# Patient Record
Sex: MALE | Race: BLACK OR AFRICAN AMERICAN | NOT HISPANIC OR LATINO | Employment: FULL TIME | URBAN - METROPOLITAN AREA
[De-identification: names, ages, dates, MRNs, and addresses within clinical notes are randomized per-mention and may not be internally consistent; named-entity substitution may affect disease eponyms.]

---

## 2022-03-25 ENCOUNTER — OFFICE VISIT (OUTPATIENT)
Dept: GASTROENTEROLOGY | Facility: CLINIC | Age: 57
End: 2022-03-25
Payer: MEDICAID

## 2022-03-25 VITALS
SYSTOLIC BLOOD PRESSURE: 143 MMHG | HEIGHT: 71 IN | WEIGHT: 175.06 LBS | DIASTOLIC BLOOD PRESSURE: 68 MMHG | BODY MASS INDEX: 24.51 KG/M2 | HEART RATE: 69 BPM

## 2022-03-25 DIAGNOSIS — R10.9 ABDOMINAL PAIN, UNSPECIFIED ABDOMINAL LOCATION: Primary | ICD-10-CM

## 2022-03-25 DIAGNOSIS — Z87.19 HISTORY OF GALLSTONES: ICD-10-CM

## 2022-03-25 PROCEDURE — 3008F BODY MASS INDEX DOCD: CPT | Mod: CPTII,,, | Performed by: NURSE PRACTITIONER

## 2022-03-25 PROCEDURE — 99204 PR OFFICE/OUTPT VISIT, NEW, LEVL IV, 45-59 MIN: ICD-10-PCS | Mod: S$PBB,,, | Performed by: NURSE PRACTITIONER

## 2022-03-25 PROCEDURE — 1159F PR MEDICATION LIST DOCUMENTED IN MEDICAL RECORD: ICD-10-PCS | Mod: CPTII,,, | Performed by: NURSE PRACTITIONER

## 2022-03-25 PROCEDURE — 3078F DIAST BP <80 MM HG: CPT | Mod: CPTII,,, | Performed by: NURSE PRACTITIONER

## 2022-03-25 PROCEDURE — 1159F MED LIST DOCD IN RCRD: CPT | Mod: CPTII,,, | Performed by: NURSE PRACTITIONER

## 2022-03-25 PROCEDURE — 3077F PR MOST RECENT SYSTOLIC BLOOD PRESSURE >= 140 MM HG: ICD-10-PCS | Mod: CPTII,,, | Performed by: NURSE PRACTITIONER

## 2022-03-25 PROCEDURE — 99203 OFFICE O/P NEW LOW 30 MIN: CPT | Mod: PBBFAC,PN | Performed by: NURSE PRACTITIONER

## 2022-03-25 PROCEDURE — 99999 PR PBB SHADOW E&M-NEW PATIENT-LVL III: CPT | Mod: PBBFAC,,, | Performed by: NURSE PRACTITIONER

## 2022-03-25 PROCEDURE — 99204 OFFICE O/P NEW MOD 45 MIN: CPT | Mod: S$PBB,,, | Performed by: NURSE PRACTITIONER

## 2022-03-25 PROCEDURE — 3008F PR BODY MASS INDEX (BMI) DOCUMENTED: ICD-10-PCS | Mod: CPTII,,, | Performed by: NURSE PRACTITIONER

## 2022-03-25 PROCEDURE — 99999 PR PBB SHADOW E&M-NEW PATIENT-LVL III: ICD-10-PCS | Mod: PBBFAC,,, | Performed by: NURSE PRACTITIONER

## 2022-03-25 PROCEDURE — 3078F PR MOST RECENT DIASTOLIC BLOOD PRESSURE < 80 MM HG: ICD-10-PCS | Mod: CPTII,,, | Performed by: NURSE PRACTITIONER

## 2022-03-25 PROCEDURE — 3077F SYST BP >= 140 MM HG: CPT | Mod: CPTII,,, | Performed by: NURSE PRACTITIONER

## 2022-03-25 RX ORDER — PANTOPRAZOLE SODIUM 40 MG/1
40 TABLET, DELAYED RELEASE ORAL DAILY
Status: ON HOLD | COMMUNITY
Start: 2022-01-31 | End: 2023-12-19 | Stop reason: HOSPADM

## 2022-03-25 RX ORDER — TAMSULOSIN HYDROCHLORIDE 0.4 MG/1
CAPSULE ORAL
COMMUNITY

## 2022-03-25 RX ORDER — DICYCLOMINE HYDROCHLORIDE 20 MG/1
20 TABLET ORAL 4 TIMES DAILY
Qty: 120 TABLET | Refills: 0 | Status: SHIPPED | OUTPATIENT
Start: 2022-03-25 | End: 2022-04-24

## 2022-03-25 NOTE — PROGRESS NOTES
GASTROENTEROLOGY CLINIC NOTE    Chief Complaint: The encounter diagnosis was Abdominal pain, unspecified abdominal location.  Referring provider/PCP: Seb Rene MD    HPI:  Vishal Dominguez is a 57 y.o. male who is a new patient to me with a PMH of abdominal pain.  He is here today to establish care for abdominal pain. This is a new problem that has been ongoing for about one year. The pain occurs about 2-3 times a day with sudden onset and lasts about 30 minutes then subsides.  It is aggravated by eating and occasionally alleviated with having a bowel movement.  Pain is primarily located in the RUQ and does not radiate.  He describes the pain as stabbing in nature.  The pain is not aggravated or alleviated with changes in position.  Bowel movements occur daily but he does report occasional constipation.  No melena, hematochezia, reflux, or pyrosis.  He has tried Protonix and Prilosec for his symptoms and reports no improvement.  He sought care at emergency room about one year ago for symptoms. Ultrasound and abdominal xray performed.  Ultrasound revealed gallbladder polyp, few gallstones, and CBD 6.8mm.  Moderate stool burden noted on xray.      NSAIDs: No  Anticoagulation or Antiplatelet: No    Prior Upper Endoscopy: No  Prior Colonoscopy: No  Family h/o Colon Cancer: No  Family h/o Crohn's Disease or Ulcerative Colitis: No  Family h/o Celiac Sprue: No  Abdominal Surgeries: 2009 bilateral inguinal hernia repair    Review of Systems   Constitutional: Negative for weight loss.   HENT: Negative for sore throat.    Eyes: Negative for blurred vision.   Respiratory: Negative for cough.    Cardiovascular: Negative for chest pain.   Gastrointestinal: Positive for abdominal pain (RUQ). Negative for blood in stool, constipation, diarrhea, heartburn, melena, nausea and vomiting.   Genitourinary: Negative for dysuria.   Musculoskeletal: Negative for myalgias.   Skin: Negative for rash.   Neurological: Negative for  "headaches.   Endo/Heme/Allergies: Negative for environmental allergies.   Psychiatric/Behavioral: Negative for suicidal ideas. The patient is not nervous/anxious.        Past Medical History: has no past medical history on file.    Past Surgical History: has no past surgical history on file.    Family History:family history is not on file.    Allergies: Review of patient's allergies indicates:  No Known Allergies    Social History:     Home medications:   Current Outpatient Medications on File Prior to Visit   Medication Sig Dispense Refill    ondansetron (ZOFRAN-ODT) 4 MG TbDL Take 1 tablet (4 mg total) by mouth every 6 (six) hours as needed (nausea or vomiting). 20 tablet 0    pantoprazole (PROTONIX) 40 MG tablet Take 40 mg by mouth once daily.      tamsulosin (FLOMAX) 0.4 mg Cap tamsulosin 0.4 mg capsule   TAKE ONE CAPSULE BY MOUTH EVERY DAY      omeprazole (PRILOSEC) 20 MG capsule Take 1 capsule (20 mg total) by mouth once daily. 30 capsule 0     No current facility-administered medications on file prior to visit.       Vital signs:  BP (!) 143/68   Pulse 69   Ht 5' 11" (1.803 m)   Wt 79.4 kg (175 lb 0.7 oz)   BMI 24.41 kg/m²     Physical Exam  Vitals reviewed.   Constitutional:       General: He is not in acute distress.     Appearance: Normal appearance. He is not ill-appearing.   HENT:      Head: Normocephalic.   Cardiovascular:      Rate and Rhythm: Normal rate and regular rhythm.      Heart sounds: Normal heart sounds. No murmur heard.  Pulmonary:      Effort: Pulmonary effort is normal. No respiratory distress.      Breath sounds: Normal breath sounds.   Chest:      Chest wall: No tenderness.   Abdominal:      General: Bowel sounds are normal. There is no distension.      Palpations: Abdomen is soft.      Tenderness: There is abdominal tenderness in the right upper quadrant. Negative signs include Khalil's sign.      Hernia: No hernia is present.      Comments: Negative Carnett's Sign   Skin:     " General: Skin is warm.   Neurological:      Mental Status: He is alert and oriented to person, place, and time.   Psychiatric:         Mood and Affect: Mood normal.         Behavior: Behavior normal.         Routine labs:  Lab Results   Component Value Date    WBC 10.00 03/01/2021    HGB 13.2 (L) 03/01/2021    HCT 38.7 (L) 03/01/2021    MCV 80 (L) 03/01/2021     03/01/2021     No results found for: INR  No results found for: IRON, FERRITIN, TIBC, FESATURATED  Lab Results   Component Value Date     03/01/2021    K 4.1 03/01/2021     03/01/2021    CO2 26 03/01/2021    BUN 12 03/01/2021    CREATININE 0.9 03/01/2021     Lab Results   Component Value Date    ALBUMIN 4.2 03/01/2021    ALT 17 03/01/2021    AST 24 03/01/2021    ALKPHOS 46 03/01/2021    BILITOT 0.7 03/01/2021     No results found for: GLUCOSE  No results found for: TSH  Lab Results   Component Value Date    CALCIUM 9.0 03/01/2021       Imaging:      I have reviewed prior labs, imaging, and notes.      Assessment:  1. Abdominal pain, unspecified abdominal location    2. History of gallstones        Plan:  Orders Placed This Encounter    US Abdomen Complete    X-Ray Abdomen AP 1 View    dicyclomine (BENTYL) 20 mg tablet     Abdominal ultrasound d/t continued abdominal pain and h/o gallstones.  Abdominal xray to further evaluate stool burden.   Bentyl 20mg QID PRN for abdominal pain.     Recommend colonoscopy for colon cancer screening. Patient would like to have ultrasound and xray done first. If pain persists and imaging is unrevealing, consider colonoscopy.     Plan of care discussed with patient who is in agreement and verbalized understanding.     I have explained the planned procedures to the patient.The risks, benefits and alternatives of the procedure were also explained in detail. Patient verbalized understanding, all questions were answered. The patient agrees to proceed as planned    Follow Up: Pending Above Workup          Trudy  YASMIN Lackey,FNP-BC  [facility]/[place]

## 2022-05-13 ENCOUNTER — TELEPHONE (OUTPATIENT)
Dept: PULMONOLOGY | Facility: CLINIC | Age: 57
End: 2022-05-13
Payer: MEDICAID

## 2022-05-13 NOTE — TELEPHONE ENCOUNTER
Gave patient appt for 6/3/2022 to see Trudy Castro and appt on 6/2/2022 to get his US abd and xray abd.      ----- Message from Angie Reyes-Ruiz, MA sent at 5/13/2022  2:28 PM CDT -----  Spoke to patient sister and she states her brother call [phone] but they stated it wasn't available. Can you please call his sister  at [phone]. Thank you.  ----- Message -----  From: Maria E Hickman  Sent: 5/13/2022   2:26 PM CDT  To: [facility]iy[facility] Yates Staff    Type:  Needs Medical Advice    Who Called:sister  Reason:needs to see you on a Tuesday for a possible ulcer   Would the patient rather a call back or a response via [facility]? call  Best Call Back Number: [phone]  Additional Information: none

## 2022-06-03 DIAGNOSIS — Z87.19 HISTORY OF GALLSTONES: ICD-10-CM

## 2022-06-03 DIAGNOSIS — R10.9 ABDOMINAL PAIN, UNSPECIFIED ABDOMINAL LOCATION: Primary | ICD-10-CM

## 2022-06-23 ENCOUNTER — PATIENT MESSAGE (OUTPATIENT)
Dept: GASTROENTEROLOGY | Facility: CLINIC | Age: 57
End: 2022-06-23
Payer: MEDICAID

## 2022-06-23 DIAGNOSIS — Z87.19 HISTORY OF GALLSTONES: Primary | ICD-10-CM

## 2022-06-24 ENCOUNTER — TELEPHONE (OUTPATIENT)
Dept: GASTROENTEROLOGY | Facility: CLINIC | Age: 57
End: 2022-06-24
Payer: MEDICAID

## 2022-06-24 NOTE — TELEPHONE ENCOUNTER
[facility] for the patient to call for results. I spoke with his sister and she will have him call me.        ----- Message from Trudy Castro NP sent at 6/23/2022  3:49 PM CDT -----  Please let patient know I reviewed both his ultrasound and xray results.  Gallstones noted in gallbladder.  I am going to enter a referral in for him to meet with surgery as this may be contributing to his symptoms.  Xray shows stool throughout the large intestine.  I would like him to start Metamucil daily. This is a fiber supplement to help form the right consistency of stool and move it through the large intestine. Thanks.

## 2022-07-05 ENCOUNTER — OFFICE VISIT (OUTPATIENT)
Dept: SURGERY | Facility: CLINIC | Age: 57
End: 2022-07-05
Payer: MEDICAID

## 2022-07-05 VITALS
OXYGEN SATURATION: 99 % | WEIGHT: 176.81 LBS | BODY MASS INDEX: 24.66 KG/M2 | SYSTOLIC BLOOD PRESSURE: 141 MMHG | DIASTOLIC BLOOD PRESSURE: 71 MMHG | HEART RATE: 69 BPM

## 2022-07-05 DIAGNOSIS — Z87.19 HISTORY OF GALLSTONES: Primary | ICD-10-CM

## 2022-07-05 PROCEDURE — 1160F RVW MEDS BY RX/DR IN RCRD: CPT | Mod: CPTII,,, | Performed by: SURGERY

## 2022-07-05 PROCEDURE — 3077F PR MOST RECENT SYSTOLIC BLOOD PRESSURE >= 140 MM HG: ICD-10-PCS | Mod: CPTII,,, | Performed by: SURGERY

## 2022-07-05 PROCEDURE — 99203 PR OFFICE/OUTPT VISIT, NEW, LEVL III, 30-44 MIN: ICD-10-PCS | Mod: S$PBB,,, | Performed by: SURGERY

## 2022-07-05 PROCEDURE — 99999 PR PBB SHADOW E&M-EST. PATIENT-LVL IV: ICD-10-PCS | Mod: PBBFAC,,, | Performed by: SURGERY

## 2022-07-05 PROCEDURE — 3078F DIAST BP <80 MM HG: CPT | Mod: CPTII,,, | Performed by: SURGERY

## 2022-07-05 PROCEDURE — 1159F PR MEDICATION LIST DOCUMENTED IN MEDICAL RECORD: ICD-10-PCS | Mod: CPTII,,, | Performed by: SURGERY

## 2022-07-05 PROCEDURE — 1160F PR REVIEW ALL MEDS BY PRESCRIBER/CLIN PHARMACIST DOCUMENTED: ICD-10-PCS | Mod: CPTII,,, | Performed by: SURGERY

## 2022-07-05 PROCEDURE — 1159F MED LIST DOCD IN RCRD: CPT | Mod: CPTII,,, | Performed by: SURGERY

## 2022-07-05 PROCEDURE — 3078F PR MOST RECENT DIASTOLIC BLOOD PRESSURE < 80 MM HG: ICD-10-PCS | Mod: CPTII,,, | Performed by: SURGERY

## 2022-07-05 PROCEDURE — 99999 PR PBB SHADOW E&M-EST. PATIENT-LVL IV: CPT | Mod: PBBFAC,,, | Performed by: SURGERY

## 2022-07-05 PROCEDURE — 99214 OFFICE O/P EST MOD 30 MIN: CPT | Mod: PBBFAC,PN | Performed by: SURGERY

## 2022-07-05 PROCEDURE — 3008F BODY MASS INDEX DOCD: CPT | Mod: CPTII,,, | Performed by: SURGERY

## 2022-07-05 PROCEDURE — 3008F PR BODY MASS INDEX (BMI) DOCUMENTED: ICD-10-PCS | Mod: CPTII,,, | Performed by: SURGERY

## 2022-07-05 PROCEDURE — 99203 OFFICE O/P NEW LOW 30 MIN: CPT | Mod: S$PBB,,, | Performed by: SURGERY

## 2022-07-05 PROCEDURE — 3077F SYST BP >= 140 MM HG: CPT | Mod: CPTII,,, | Performed by: SURGERY

## 2022-07-10 NOTE — PROGRESS NOTES
History & Physical    SUBJECTIVE:     History of Present Illness:  Patient is a 57 y.o. male presents with epigastric and right upper quadrant abdominal pain symptoms associated with meals.  Has not had any severe attacks however does continue to persist with this pain.  For ultrasound shows small gallstones versus a polyp.  Discussed with patient surgical intervention versus observation.  Also discussed HIDA scan to see if he had any biliary colic or dyskinesia.  After HIDA scan will go over results to discuss need for surgical intervention.      Chief Complaint   Patient presents with    Gall Bladder Problem     Pain       Review of patient's allergies indicates:  No Known Allergies    Current Outpatient Medications   Medication Sig Dispense Refill    ondansetron (ZOFRAN-ODT) 4 MG TbDL Take 1 tablet (4 mg total) by mouth every 6 (six) hours as needed (nausea or vomiting). 20 tablet 0    pantoprazole (PROTONIX) 40 MG tablet Take 40 mg by mouth once daily.      tamsulosin (FLOMAX) 0.4 mg Cap tamsulosin 0.4 mg capsule   TAKE ONE CAPSULE BY MOUTH EVERY DAY      omeprazole (PRILOSEC) 20 MG capsule Take 1 capsule (20 mg total) by mouth once daily. 30 capsule 0     No current facility-administered medications for this visit.       No past medical history on file.  No past surgical history on file.  No family history on file.        Review of Systems:  Review of Systems   Constitutional: Negative for appetite change, fatigue, fever and unexpected weight change.   HENT: Negative for sore throat and trouble swallowing.    Eyes: Negative.    Respiratory: Negative for cough, shortness of breath and wheezing.    Cardiovascular: Negative for chest pain and leg swelling.   Gastrointestinal: Positive for abdominal pain. Negative for abdominal distention, blood in stool, constipation, diarrhea, nausea and vomiting.   Endocrine: Negative.    Genitourinary: Negative.    Musculoskeletal: Negative for back pain.   Skin: Negative.   Negative for rash.   Allergic/Immunologic: Negative.    Neurological: Negative.    Hematological: Negative.    Psychiatric/Behavioral: Negative for confusion.       OBJECTIVE:     Vital Signs (Most Recent)  Pulse: 69 (07/05/22 0811)  BP: (!) 141/71 (07/05/22 0811)  SpO2: 99 % (07/05/22 0811)     80.2 kg (176 lb 12.9 oz)     Physical Exam:  Physical Exam  Vitals and nursing note reviewed.   Constitutional:       Appearance: He is well-developed.   HENT:      Head: Normocephalic and atraumatic.   Cardiovascular:      Rate and Rhythm: Normal rate.      Heart sounds: Normal heart sounds.   Pulmonary:      Effort: Pulmonary effort is normal.   Abdominal:      General: Bowel sounds are normal. There is no distension.      Palpations: Abdomen is soft.      Tenderness: There is no abdominal tenderness.   Musculoskeletal:         General: Normal range of motion.      Cervical back: Normal range of motion.   Skin:     General: Skin is warm and dry.      Capillary Refill: Capillary refill takes less than 2 seconds.   Neurological:      Mental Status: He is alert and oriented to person, place, and time.   Psychiatric:         Behavior: Behavior normal.         Laboratory  CBC: Reviewed  CMP: Reviewed    Diagnostic Results:  US: Reviewed  Small gallbladder stones versus polyps    ASSESSMENT/PLAN:     57-year-old male with small gallbladder stone versus polyp and abdominal pain.  HIDA scan for now.  Return to clinic after go over results.

## 2022-07-15 ENCOUNTER — OFFICE VISIT (OUTPATIENT)
Dept: GASTROENTEROLOGY | Facility: CLINIC | Age: 57
End: 2022-07-15
Payer: MEDICAID

## 2022-07-15 VITALS
HEIGHT: 71 IN | BODY MASS INDEX: 24.97 KG/M2 | SYSTOLIC BLOOD PRESSURE: 132 MMHG | HEART RATE: 65 BPM | WEIGHT: 178.38 LBS | DIASTOLIC BLOOD PRESSURE: 64 MMHG | OXYGEN SATURATION: 97 %

## 2022-07-15 DIAGNOSIS — K59.04 CHRONIC IDIOPATHIC CONSTIPATION: Primary | ICD-10-CM

## 2022-07-15 DIAGNOSIS — R35.0 FREQUENT URINATION: ICD-10-CM

## 2022-07-15 DIAGNOSIS — Z12.11 SCREENING FOR COLON CANCER: ICD-10-CM

## 2022-07-15 PROCEDURE — 3075F SYST BP GE 130 - 139MM HG: CPT | Mod: CPTII,,, | Performed by: NURSE PRACTITIONER

## 2022-07-15 PROCEDURE — 3008F PR BODY MASS INDEX (BMI) DOCUMENTED: ICD-10-PCS | Mod: CPTII,,, | Performed by: NURSE PRACTITIONER

## 2022-07-15 PROCEDURE — 3008F BODY MASS INDEX DOCD: CPT | Mod: CPTII,,, | Performed by: NURSE PRACTITIONER

## 2022-07-15 PROCEDURE — 99214 OFFICE O/P EST MOD 30 MIN: CPT | Mod: S$PBB,,, | Performed by: NURSE PRACTITIONER

## 2022-07-15 PROCEDURE — 3078F PR MOST RECENT DIASTOLIC BLOOD PRESSURE < 80 MM HG: ICD-10-PCS | Mod: CPTII,,, | Performed by: NURSE PRACTITIONER

## 2022-07-15 PROCEDURE — 99999 PR PBB SHADOW E&M-EST. PATIENT-LVL V: CPT | Mod: PBBFAC,,, | Performed by: NURSE PRACTITIONER

## 2022-07-15 PROCEDURE — 3075F PR MOST RECENT SYSTOLIC BLOOD PRESS GE 130-139MM HG: ICD-10-PCS | Mod: CPTII,,, | Performed by: NURSE PRACTITIONER

## 2022-07-15 PROCEDURE — 99215 OFFICE O/P EST HI 40 MIN: CPT | Mod: PBBFAC,PN | Performed by: NURSE PRACTITIONER

## 2022-07-15 PROCEDURE — 1159F MED LIST DOCD IN RCRD: CPT | Mod: CPTII,,, | Performed by: NURSE PRACTITIONER

## 2022-07-15 PROCEDURE — 1159F PR MEDICATION LIST DOCUMENTED IN MEDICAL RECORD: ICD-10-PCS | Mod: CPTII,,, | Performed by: NURSE PRACTITIONER

## 2022-07-15 PROCEDURE — 99214 PR OFFICE/OUTPT VISIT, EST, LEVL IV, 30-39 MIN: ICD-10-PCS | Mod: S$PBB,,, | Performed by: NURSE PRACTITIONER

## 2022-07-15 PROCEDURE — 99999 PR PBB SHADOW E&M-EST. PATIENT-LVL V: ICD-10-PCS | Mod: PBBFAC,,, | Performed by: NURSE PRACTITIONER

## 2022-07-15 PROCEDURE — 3078F DIAST BP <80 MM HG: CPT | Mod: CPTII,,, | Performed by: NURSE PRACTITIONER

## 2022-07-15 NOTE — PROGRESS NOTES
GASTROENTEROLOGY CLINIC NOTE    Chief Complaint: There were no encounter diagnoses.  Referring provider/PCP: Seb Rene MD    HPI:  Vishal Dominguez is a 57 y.o. male who is a new patient to me with a PMH of abdominal pain.  He is here today to establish care for abdominal pain. This is a new problem that has been ongoing for about one year. The pain occurs about 2-3 times a day with sudden onset and lasts about 30 minutes then subsides.  It is aggravated by eating and occasionally alleviated with having a bowel movement.  Pain is primarily located in the RUQ and does not radiate.  He describes the pain as stabbing in nature.  The pain is not aggravated or alleviated with changes in position.  Bowel movements occur daily but he does report occasional constipation.  No melena, hematochezia, reflux, or pyrosis.  He has tried Protonix and Prilosec for his symptoms and reports no improvement.  He sought care at emergency room about one year ago for symptoms. Ultrasound and abdominal xray performed.  Ultrasound revealed gallbladder polyp, few gallstones, and CBD 6.8mm.  Moderate stool burden noted on xray.      Interval Note 7/15/2022  Mr. Dominguez who is known to me presents to clinic for follow up visit to review ultrasound and xray results.  Following his last appointment, ultrasound and KUB were obtained due to continued RUQ abdominal pain.  Ultrasound revealed gallstone and stool noted on left side of colon with KUB.  He was referred to surgery to discuss cholecystectomy.  HIDA scan was done which was normal; he is to follow up with surgery to discuss HIDA results. Reports abdominal pain has improved.  Bowel movements occur daily bur still straining some.  No nausea, vomiting, reflux, nocturnal symptoms, melena, or hematochezia. He is c/o urinary frequency.     NSAIDs: No  Anticoagulation or Antiplatelet: No    Prior Upper Endoscopy: No  Prior Colonoscopy: No  Family h/o Colon Cancer: No  Family h/o  "Crohn's Disease or Ulcerative Colitis: No  Family h/o Celiac Sprue: No  Abdominal Surgeries: 2009 bilateral inguinal hernia repair    Review of Systems   Constitutional: Negative for weight loss.   HENT: Negative for sore throat.    Eyes: Negative for blurred vision.   Respiratory: Negative for cough.    Cardiovascular: Negative for chest pain.   Gastrointestinal: Negative for abdominal pain, blood in stool, constipation, diarrhea, heartburn, melena, nausea and vomiting.   Genitourinary: Positive for frequency. Negative for dysuria.   Musculoskeletal: Negative for myalgias.   Skin: Negative for rash.   Neurological: Negative for headaches.   Endo/Heme/Allergies: Negative for environmental allergies.   Psychiatric/Behavioral: Negative for suicidal ideas. The patient is not nervous/anxious.        Past Medical History: has no past medical history on file.    Past Surgical History: has no past surgical history on file.    Family History:family history is not on file.    Allergies: Review of patient's allergies indicates:  No Known Allergies    Social History:     Home medications:   Current Outpatient Medications on File Prior to Visit   Medication Sig Dispense Refill    omeprazole (PRILOSEC) 20 MG capsule Take 1 capsule (20 mg total) by mouth once daily. 30 capsule 0    ondansetron (ZOFRAN-ODT) 4 MG TbDL Take 1 tablet (4 mg total) by mouth every 6 (six) hours as needed (nausea or vomiting). 20 tablet 0    pantoprazole (PROTONIX) 40 MG tablet Take 40 mg by mouth once daily.      tamsulosin (FLOMAX) 0.4 mg Cap tamsulosin 0.4 mg capsule   TAKE ONE CAPSULE BY MOUTH EVERY DAY       No current facility-administered medications on file prior to visit.       Vital signs:  /64 (BP Location: Left arm, Patient Position: Sitting, BP Method: Large (Automatic))   Pulse 65   Ht 5' 11" (1.803 m)   Wt 80.9 kg (178 lb 5.6 oz)   SpO2 97%   BMI 24.88 kg/m²     Physical Exam  Vitals reviewed.   Constitutional:       " General: He is not in acute distress.     Appearance: Normal appearance. He is not ill-appearing.   HENT:      Head: Normocephalic.   Cardiovascular:      Rate and Rhythm: Normal rate and regular rhythm.      Heart sounds: Normal heart sounds. No murmur heard.  Pulmonary:      Effort: Pulmonary effort is normal. No respiratory distress.      Breath sounds: Normal breath sounds.   Chest:      Chest wall: No tenderness.   Abdominal:      General: Bowel sounds are normal. There is no distension.      Palpations: Abdomen is soft.      Tenderness: There is no abdominal tenderness. Negative signs include Khalil's sign.      Hernia: No hernia is present.   Skin:     General: Skin is warm.   Neurological:      Mental Status: He is alert and oriented to person, place, and time.   Psychiatric:         Mood and Affect: Mood normal.         Behavior: Behavior normal.         Routine labs:  Lab Results   Component Value Date    WBC 10.00 03/01/2021    HGB 13.2 (L) 03/01/2021    HCT 38.7 (L) 03/01/2021    MCV 80 (L) 03/01/2021     03/01/2021     No results found for: INR  No results found for: IRON, FERRITIN, TIBC, FESATURATED  Lab Results   Component Value Date     03/01/2021    K 4.1 03/01/2021     03/01/2021    CO2 26 03/01/2021    BUN 12 03/01/2021    CREATININE 0.9 03/01/2021     Lab Results   Component Value Date    ALBUMIN 4.2 03/01/2021    ALT 17 03/01/2021    AST 24 03/01/2021    ALKPHOS 46 03/01/2021    BILITOT 0.7 03/01/2021     No results found for: GLUCOSE  No results found for: TSH  Lab Results   Component Value Date    CALCIUM 9.0 03/01/2021       Imaging:      I have reviewed prior labs, imaging, and notes.      Assessment:  1. Chronic idiopathic constipation    2. Frequent urination    3. Screening for colon cancer        Plan:  Orders Placed This Encounter    Ambulatory referral/consult to Urology    psyllium (HYDROCIL) packet    sod sulf-pot chloride-mag sulf (SUTAB) 1.479-0.188- 0.225  gram tablet    Case Request Endoscopy: COLONOSCOPY     Colonoscopy for colon cancer screening. Sutab.  Psyllium daily.   Referral to urology for frequent urination.  Follow up with surgery to discuss HIDA Scan results and any further interventions.     Plan of care discussed with patient who is in agreement and verbalized understanding.     I have explained the planned procedures to the patient.The risks, benefits and alternatives of the procedure were also explained in detail. Patient verbalized understanding, all questions were answered. The patient agrees to proceed as planned    Follow Up: As Needed          YASMIN Calzada,FNP-BC  [facility]/[place]

## 2022-07-18 NOTE — PROGRESS NOTES
Subjective:      Vishal Dominguez is a 57 y.o. male who presents for evaluation of LUTS and gross hematuria.      Benign Prostatic Hypertrophy  Patient complains of lower urinary tract symptoms. He reports frequency, incomplete emptying, intermittency, nocturia four times a night, straining, urgency and weak stream. He denies dysuria. Patient states symptoms are of severe severity. Onset of symptoms was several weeks ago and was unknown in onset. His AUA Symptom Score is, 32/6. He has no personal history and no family history of prostate cancer. He reports a history of gross hematuria. States that he has one episode of painless hematuria 1 week ago. He denies flank pain, kidney stones and recurrent UTI.  He has been on Flomax with no improvement in symptoms.  Hematuria  Patient complains of gross hematuria. Onset of hematuria was 1 week ago and was sudden in onset. There is not a history of nephrolithiasis. There is not a history of urologic trauma. Other urologic symptoms include slow stream, hesitancy, intermittency, nocturia, urgency, sense of residual urine, dysuria. Patient admits to history of tobacco use. S/p bilateral inguinal hernia repair several years ago. Patient denies history of Agent Orange exposure, chronic Hector catheter, occupational exposure and trauma. Prior workup has been none.      The following portions of the patient's history were reviewed and updated as appropriate: allergies, current medications, past family history, past medical history, past social history, past surgical history and problem list.    Review of Systems  Constitutional: no fever or chills  ENT: no nasal congestion or sore throat  Respiratory: no cough or shortness of breath  Cardiovascular: no chest pain or palpitations  Gastrointestinal: no nausea or vomiting, tolerating diet  Genitourinary: as per HPI  Hematologic/Lymphatic: no easy bruising or lymphadenopathy  Musculoskeletal: no arthralgias or myalgias  Neurological: no  "seizures or tremors  Behavioral/Psych: no auditory or visual hallucinations     Objective:   Vitals: /73 (BP Location: Right arm, Patient Position: Sitting, BP Method: Small (Automatic))   Pulse 65   Resp 18   Ht 5' 11" (1.803 m)   Wt 79.7 kg (175 lb 11.3 oz)   BMI 24.51 kg/m²     Physical Exam   General: alert and oriented, no acute distress  Head: normocephalic, atraumatic  Neck: supple, no lymphadenopathy, normal ROM, no masses  Respiratory: Symmetric expansion, non-labored breathing  Cardiovascular: regular rate and rhythm, nomal pulses, no peripheral edema  : declined   Abdomen: soft, non tender, non distended  Skin: normal coloration and turgor, no rashes, no suspicious skin lesions noted  Neuro: alert and oriented x3, no gross deficits  Psych: normal judgment and insight, normal mood/affect and non-anxious    Physical Exam    Lab Review   Urinalysis demonstrates sent for UC/UA  Lab Results   Component Value Date    WBC 10.00 03/01/2021    HGB 13.2 (L) 03/01/2021    HCT 38.7 (L) 03/01/2021    MCV 80 (L) 03/01/2021     03/01/2021     Lab Results   Component Value Date    CREATININE 0.9 03/01/2021    BUN 12 03/01/2021   .No results found for: PSA, PSADIAG, PSATOTAL, PSAFREE, PSAFREEPCT    No results found for: PSA      Bladder Scan PVR: 0cc     Assessment and Plan:   1. Gross hematuria  -- Discussed differential diagnosis of hematuria including but limited to: infection, inflammation, kidney stones, neoplasms of kidney, ureter, or bladder, trauma, prostate related issues such as BPH, prostate cancer, prostatitis.   - Urine culture  - CT Urogram Abd Pelvis W WO; Future  - Urinalysis Microscopic  - Cytology, Urine    2. Urinary frequency  --Will send UA and UC to r/o infectious process   --Continue Flomax  --Trial of vesicare; suspect some degree of symptoms maybe related to BPH/DE [place] TORRE; will have diagnostic cysto with hematuria workup.   - solifenacin (VESICARE) 10 MG tablet; Take 1 tablet (10 mg " total) by mouth once daily.  Dispense: 30 tablet; Refill: 11    3. Encounter for prostate cancer screening  --PSA today  --KIMMY at follow up  - Prostate Specific Antigen, Diagnostic; Future     --rtc after CT   This note is dictated on M*Modal word recognition program.  There are word recognition mistakes that are occasionally missed on review.

## 2022-07-19 ENCOUNTER — OFFICE VISIT (OUTPATIENT)
Dept: SURGERY | Facility: CLINIC | Age: 57
End: 2022-07-19
Payer: MEDICAID

## 2022-07-19 ENCOUNTER — OFFICE VISIT (OUTPATIENT)
Dept: UROLOGY | Facility: CLINIC | Age: 57
End: 2022-07-19
Payer: MEDICAID

## 2022-07-19 VITALS
HEART RATE: 65 BPM | RESPIRATION RATE: 18 BRPM | DIASTOLIC BLOOD PRESSURE: 73 MMHG | SYSTOLIC BLOOD PRESSURE: 135 MMHG | WEIGHT: 175.69 LBS | BODY MASS INDEX: 24.6 KG/M2 | HEIGHT: 71 IN

## 2022-07-19 DIAGNOSIS — R10.10 PAIN OF UPPER ABDOMEN: Primary | ICD-10-CM

## 2022-07-19 DIAGNOSIS — Z12.5 ENCOUNTER FOR PROSTATE CANCER SCREENING: ICD-10-CM

## 2022-07-19 DIAGNOSIS — R35.0 URINARY FREQUENCY: Primary | ICD-10-CM

## 2022-07-19 DIAGNOSIS — R31.0 GROSS HEMATURIA: ICD-10-CM

## 2022-07-19 LAB
MICROSCOPIC COMMENT: NORMAL
POC RESIDUAL URINE VOLUME: 0 ML (ref 0–100)
RBC #/AREA URNS HPF: 1 /HPF (ref 0–4)
WBC #/AREA URNS HPF: 1 /HPF (ref 0–5)

## 2022-07-19 PROCEDURE — 1160F RVW MEDS BY RX/DR IN RCRD: CPT | Mod: CPTII,,, | Performed by: NURSE PRACTITIONER

## 2022-07-19 PROCEDURE — 99212 PR OFFICE/OUTPT VISIT, EST, LEVL II, 10-19 MIN: ICD-10-PCS | Mod: 95,,, | Performed by: SURGERY

## 2022-07-19 PROCEDURE — 3078F DIAST BP <80 MM HG: CPT | Mod: CPTII,,, | Performed by: NURSE PRACTITIONER

## 2022-07-19 PROCEDURE — 3078F PR MOST RECENT DIASTOLIC BLOOD PRESSURE < 80 MM HG: ICD-10-PCS | Mod: CPTII,,, | Performed by: NURSE PRACTITIONER

## 2022-07-19 PROCEDURE — 3008F PR BODY MASS INDEX (BMI) DOCUMENTED: ICD-10-PCS | Mod: CPTII,,, | Performed by: NURSE PRACTITIONER

## 2022-07-19 PROCEDURE — 3008F BODY MASS INDEX DOCD: CPT | Mod: CPTII,,, | Performed by: NURSE PRACTITIONER

## 2022-07-19 PROCEDURE — 3075F SYST BP GE 130 - 139MM HG: CPT | Mod: CPTII,,, | Performed by: NURSE PRACTITIONER

## 2022-07-19 PROCEDURE — 1160F RVW MEDS BY RX/DR IN RCRD: CPT | Mod: CPTII,95,, | Performed by: SURGERY

## 2022-07-19 PROCEDURE — 1159F PR MEDICATION LIST DOCUMENTED IN MEDICAL RECORD: ICD-10-PCS | Mod: CPTII,,, | Performed by: NURSE PRACTITIONER

## 2022-07-19 PROCEDURE — 99999 PR PBB SHADOW E&M-EST. PATIENT-LVL V: CPT | Mod: PBBFAC,,, | Performed by: NURSE PRACTITIONER

## 2022-07-19 PROCEDURE — 88112 PR  CYTOPATH, CELL ENHANCE TECH: ICD-10-PCS | Mod: 26,,, | Performed by: PATHOLOGY

## 2022-07-19 PROCEDURE — 1159F MED LIST DOCD IN RCRD: CPT | Mod: CPTII,,, | Performed by: NURSE PRACTITIONER

## 2022-07-19 PROCEDURE — 51798 US URINE CAPACITY MEASURE: CPT | Mod: PBBFAC,PN | Performed by: NURSE PRACTITIONER

## 2022-07-19 PROCEDURE — 1160F PR REVIEW ALL MEDS BY PRESCRIBER/CLIN PHARMACIST DOCUMENTED: ICD-10-PCS | Mod: CPTII,95,, | Performed by: SURGERY

## 2022-07-19 PROCEDURE — 1159F PR MEDICATION LIST DOCUMENTED IN MEDICAL RECORD: ICD-10-PCS | Mod: CPTII,95,, | Performed by: SURGERY

## 2022-07-19 PROCEDURE — 1159F MED LIST DOCD IN RCRD: CPT | Mod: CPTII,95,, | Performed by: SURGERY

## 2022-07-19 PROCEDURE — 99999 PR PBB SHADOW E&M-EST. PATIENT-LVL V: ICD-10-PCS | Mod: PBBFAC,,, | Performed by: NURSE PRACTITIONER

## 2022-07-19 PROCEDURE — 88112 CYTOPATH CELL ENHANCE TECH: CPT | Mod: 26,,, | Performed by: PATHOLOGY

## 2022-07-19 PROCEDURE — 3075F PR MOST RECENT SYSTOLIC BLOOD PRESS GE 130-139MM HG: ICD-10-PCS | Mod: CPTII,,, | Performed by: NURSE PRACTITIONER

## 2022-07-19 PROCEDURE — 99204 OFFICE O/P NEW MOD 45 MIN: CPT | Mod: S$PBB,,, | Performed by: NURSE PRACTITIONER

## 2022-07-19 PROCEDURE — 1160F PR REVIEW ALL MEDS BY PRESCRIBER/CLIN PHARMACIST DOCUMENTED: ICD-10-PCS | Mod: CPTII,,, | Performed by: NURSE PRACTITIONER

## 2022-07-19 PROCEDURE — 99204 PR OFFICE/OUTPT VISIT, NEW, LEVL IV, 45-59 MIN: ICD-10-PCS | Mod: S$PBB,,, | Performed by: NURSE PRACTITIONER

## 2022-07-19 PROCEDURE — 87086 URINE CULTURE/COLONY COUNT: CPT | Performed by: NURSE PRACTITIONER

## 2022-07-19 PROCEDURE — 99215 OFFICE O/P EST HI 40 MIN: CPT | Mod: PBBFAC,PN | Performed by: NURSE PRACTITIONER

## 2022-07-19 PROCEDURE — 99212 OFFICE O/P EST SF 10 MIN: CPT | Mod: 95,,, | Performed by: SURGERY

## 2022-07-19 RX ORDER — SOLIFENACIN SUCCINATE 10 MG/1
10 TABLET, FILM COATED ORAL DAILY
Qty: 30 TABLET | Refills: 11 | Status: SHIPPED | OUTPATIENT
Start: 2022-07-19 | End: 2023-11-28

## 2022-07-19 RX ORDER — CIPROFLOXACIN 500 MG/1
500 TABLET ORAL EVERY 12 HOURS
Qty: 14 TABLET | Refills: 0 | Status: SHIPPED | OUTPATIENT
Start: 2022-07-19 | End: 2022-07-26

## 2022-07-19 NOTE — PROGRESS NOTES
Established Patient - Audio Only Telehealth Visit     The patient location is: home   The chief complaint leading to consultation is: Abdominal pain   Visit type: Virtual visit with audio only (telephone)  Total time spent with patient:  10 minutes     The reason for the audio only service rather than synchronous audio and video virtual visit was related to technical difficulties or patient preference/necessity.     Each patient to whom I provide medical services by telemedicine is:  (1) informed of the relationship between the physician and patient and the respective role of any other health care provider with respect to management of the patient; and (2) notified that they may decline to receive medical services by telemedicine and may withdraw from such care at any time. Patient verbally consented to receive this service via voice-only telephone call.         Patient is a 57 y.o. male presents with epigastric and right upper quadrant abdominal pain symptoms associated with meals.  Has not had any severe attacks however does continue to persist with this pain.  For ultrasound shows small gallstones versus a polyp.  Discussed with patient surgical intervention versus observation.  Also discussed HIDA scan to see if he had any biliary colic or dyskinesia.  After HIDA scan will go over results to discuss need for surgical intervention.    HIDA scan does not show any abnormalities  Discussed these findings with the patient.        No need for urgent surgical intervention at this time.    Can monitor for now.    Possible repeat ultrasound if symptoms recur.             This service was not originating from a related E/M service provided within the previous 7 days nor will  to an E/M service or procedure within the next 24 hours or my soonest available appointment.  Prevailing standard of care was able to be met in this audio-only visit.

## 2022-07-21 LAB
BACTERIA UR CULT: NO GROWTH
FINAL PATHOLOGIC DIAGNOSIS: NORMAL
Lab: NORMAL

## 2022-07-26 NOTE — PROGRESS NOTES
"Subjective:      Vishal Dominguez is a 57 y.o. male who returns today regarding his CT results.    CT urogram negative for stones, hydro, masses. CT shows right posterolateral bladder diverticulum.   Started vesicare last week; no meaningful symptom relief yet.     HPI: Hematuria  Patient complains of gross hematuria. Onset of hematuria was 1 week ago and was sudden in onset. There is not a history of nephrolithiasis. There is not a history of urologic trauma. Other urologic symptoms include slow stream, hesitancy, intermittency, nocturia, urgency, sense of residual urine, dysuria. Patient admits to history of tobacco use. S/p bilateral inguinal hernia repair several years ago. Patient denies history of Agent Orange exposure, chronic Hector catheter, occupational exposure and trauma. Prior workup has been none.    The following portions of the patient's history were reviewed and updated as appropriate: allergies, current medications, past family history, past medical history, past social history, past surgical history and problem list.    Review of Systems  A comprehensive multipoint review of systems was negative except as otherwise stated in the HPI.     Objective:   Vitals: /72 (BP Location: Left arm, Patient Position: Sitting, BP Method: Large (Automatic))   Pulse 75   Resp 18   Ht 5' 10" (1.778 m)   Wt 79.9 kg (176 lb 2.4 oz)   BMI 25.27 kg/m²     Physical Exam   General: alert and oriented, no acute distress  Respiratory: Symmetric expansion, non-labored breathing  Cardiovascular: regular rate and rhythm, no peripheral edema  Abdomen: soft, non distended  Skin: normal coloration and turgor, no rashes, no suspicious skin lesions noted  Neuro: no gross deficits  Psych: normal judgment and insight, normal mood/affect and non-anxious    Lab Review   Urinalysis demonstrates no specimen   Lab Results   Component Value Date    WBC 10.00 03/01/2021    HGB 13.2 (L) 03/01/2021    HCT 38.7 (L) 03/01/2021    MCV " 80 (L) 03/01/2021     03/01/2021     Lab Results   Component Value Date    CREATININE 1.0 07/26/2022    BUN 12 03/01/2021     Lab Results   Component Value Date    PSADIAG 0.66 07/26/2022       Imaging   CT UROGRAM ABD PELVIS W WO     CLINICAL HISTORY:  gross hematuria;  Gross hematuria     FINDINGS:  Comparison is 05/24/2020.     Patient was administered 130 cc of Omnipaque 350 intravenously.     The gallbladder and biliary tree show nothing unusual.  No renal, ureter, or bladder calculi are seen.     Impression:     4 mm left lung base nodule.     Kidneys and ureters are normal.     Right posterolateral bladder diverticulum.        Electronically signed by: Bradley Rivera MD  Date:                                            07/26/2022  Time:                                           08:10      Assessment and Plan:   1. Gross hematuria  --CT Urogram negative for stones, hydro, masses; +Right posterolateral bladder diverticulum  --UC negative  --Micro UA: 1 RBC/HPF   --Cytology negative   --Proceed with cystoscope to complete workup     2. Urinary frequency  --continue flomax  --Continue vesicare; suspect some degree of symptoms maybe related to BPH/[place]; will have diagnostic cysto with hematuria workup.      3. Encounter for prostate cancer screening  --PSA 0.66    --Drop off UC prior to cystoscope   --fu after cystoscope     This note is dictated on M*Modal word recognition program.  There are word recognition mistakes that are occasionally missed on review.

## 2022-07-27 ENCOUNTER — OFFICE VISIT (OUTPATIENT)
Dept: UROLOGY | Facility: CLINIC | Age: 57
End: 2022-07-27
Payer: MEDICAID

## 2022-07-27 VITALS
HEART RATE: 75 BPM | SYSTOLIC BLOOD PRESSURE: 137 MMHG | HEIGHT: 70 IN | DIASTOLIC BLOOD PRESSURE: 72 MMHG | RESPIRATION RATE: 18 BRPM | WEIGHT: 176.13 LBS | BODY MASS INDEX: 25.21 KG/M2

## 2022-07-27 DIAGNOSIS — Z12.5 ENCOUNTER FOR PROSTATE CANCER SCREENING: ICD-10-CM

## 2022-07-27 DIAGNOSIS — R35.0 URINARY FREQUENCY: ICD-10-CM

## 2022-07-27 DIAGNOSIS — R31.0 GROSS HEMATURIA: Primary | ICD-10-CM

## 2022-07-27 PROCEDURE — 99999 PR PBB SHADOW E&M-EST. PATIENT-LVL III: ICD-10-PCS | Mod: PBBFAC,,, | Performed by: NURSE PRACTITIONER

## 2022-07-27 PROCEDURE — 3078F PR MOST RECENT DIASTOLIC BLOOD PRESSURE < 80 MM HG: ICD-10-PCS | Mod: CPTII,,, | Performed by: NURSE PRACTITIONER

## 2022-07-27 PROCEDURE — 3075F PR MOST RECENT SYSTOLIC BLOOD PRESS GE 130-139MM HG: ICD-10-PCS | Mod: CPTII,,, | Performed by: NURSE PRACTITIONER

## 2022-07-27 PROCEDURE — 99214 OFFICE O/P EST MOD 30 MIN: CPT | Mod: S$PBB,,, | Performed by: NURSE PRACTITIONER

## 2022-07-27 PROCEDURE — 1159F MED LIST DOCD IN RCRD: CPT | Mod: CPTII,,, | Performed by: NURSE PRACTITIONER

## 2022-07-27 PROCEDURE — 99999 PR PBB SHADOW E&M-EST. PATIENT-LVL III: CPT | Mod: PBBFAC,,, | Performed by: NURSE PRACTITIONER

## 2022-07-27 PROCEDURE — 1159F PR MEDICATION LIST DOCUMENTED IN MEDICAL RECORD: ICD-10-PCS | Mod: CPTII,,, | Performed by: NURSE PRACTITIONER

## 2022-07-27 PROCEDURE — 99214 PR OFFICE/OUTPT VISIT, EST, LEVL IV, 30-39 MIN: ICD-10-PCS | Mod: S$PBB,,, | Performed by: NURSE PRACTITIONER

## 2022-07-27 PROCEDURE — 1160F RVW MEDS BY RX/DR IN RCRD: CPT | Mod: CPTII,,, | Performed by: NURSE PRACTITIONER

## 2022-07-27 PROCEDURE — 99213 OFFICE O/P EST LOW 20 MIN: CPT | Mod: PBBFAC,PN | Performed by: NURSE PRACTITIONER

## 2022-07-27 PROCEDURE — 3008F BODY MASS INDEX DOCD: CPT | Mod: CPTII,,, | Performed by: NURSE PRACTITIONER

## 2022-07-27 PROCEDURE — 3008F PR BODY MASS INDEX (BMI) DOCUMENTED: ICD-10-PCS | Mod: CPTII,,, | Performed by: NURSE PRACTITIONER

## 2022-07-27 PROCEDURE — 3075F SYST BP GE 130 - 139MM HG: CPT | Mod: CPTII,,, | Performed by: NURSE PRACTITIONER

## 2022-07-27 PROCEDURE — 1160F PR REVIEW ALL MEDS BY PRESCRIBER/CLIN PHARMACIST DOCUMENTED: ICD-10-PCS | Mod: CPTII,,, | Performed by: NURSE PRACTITIONER

## 2022-07-27 PROCEDURE — 3078F DIAST BP <80 MM HG: CPT | Mod: CPTII,,, | Performed by: NURSE PRACTITIONER

## 2022-11-28 ENCOUNTER — TELEPHONE (OUTPATIENT)
Dept: GASTROENTEROLOGY | Facility: CLINIC | Age: 57
End: 2022-11-28
Payer: MEDICAID

## 2023-11-28 ENCOUNTER — OFFICE VISIT (OUTPATIENT)
Dept: GASTROENTEROLOGY | Facility: CLINIC | Age: 58
End: 2023-11-28
Payer: MEDICAID

## 2023-11-28 VITALS
HEIGHT: 71 IN | SYSTOLIC BLOOD PRESSURE: 120 MMHG | HEART RATE: 70 BPM | DIASTOLIC BLOOD PRESSURE: 66 MMHG | OXYGEN SATURATION: 99 % | WEIGHT: 161.5 LBS | BODY MASS INDEX: 22.61 KG/M2

## 2023-11-28 DIAGNOSIS — R10.9 ABDOMINAL PAIN, UNSPECIFIED ABDOMINAL LOCATION: Primary | ICD-10-CM

## 2023-11-28 PROCEDURE — 3078F PR MOST RECENT DIASTOLIC BLOOD PRESSURE < 80 MM HG: ICD-10-PCS | Mod: CPTII,,, | Performed by: STUDENT IN AN ORGANIZED HEALTH CARE EDUCATION/TRAINING PROGRAM

## 2023-11-28 PROCEDURE — 3074F PR MOST RECENT SYSTOLIC BLOOD PRESSURE < 130 MM HG: ICD-10-PCS | Mod: CPTII,,, | Performed by: STUDENT IN AN ORGANIZED HEALTH CARE EDUCATION/TRAINING PROGRAM

## 2023-11-28 PROCEDURE — 1159F MED LIST DOCD IN RCRD: CPT | Mod: CPTII,,, | Performed by: STUDENT IN AN ORGANIZED HEALTH CARE EDUCATION/TRAINING PROGRAM

## 2023-11-28 PROCEDURE — 3008F BODY MASS INDEX DOCD: CPT | Mod: CPTII,,, | Performed by: STUDENT IN AN ORGANIZED HEALTH CARE EDUCATION/TRAINING PROGRAM

## 2023-11-28 PROCEDURE — 99214 PR OFFICE/OUTPT VISIT, EST, LEVL IV, 30-39 MIN: ICD-10-PCS | Mod: S$PBB,,, | Performed by: STUDENT IN AN ORGANIZED HEALTH CARE EDUCATION/TRAINING PROGRAM

## 2023-11-28 PROCEDURE — 1159F PR MEDICATION LIST DOCUMENTED IN MEDICAL RECORD: ICD-10-PCS | Mod: CPTII,,, | Performed by: STUDENT IN AN ORGANIZED HEALTH CARE EDUCATION/TRAINING PROGRAM

## 2023-11-28 PROCEDURE — 99999 PR PBB SHADOW E&M-EST. PATIENT-LVL III: CPT | Mod: PBBFAC,,, | Performed by: STUDENT IN AN ORGANIZED HEALTH CARE EDUCATION/TRAINING PROGRAM

## 2023-11-28 PROCEDURE — 3074F SYST BP LT 130 MM HG: CPT | Mod: CPTII,,, | Performed by: STUDENT IN AN ORGANIZED HEALTH CARE EDUCATION/TRAINING PROGRAM

## 2023-11-28 PROCEDURE — 3008F PR BODY MASS INDEX (BMI) DOCUMENTED: ICD-10-PCS | Mod: CPTII,,, | Performed by: STUDENT IN AN ORGANIZED HEALTH CARE EDUCATION/TRAINING PROGRAM

## 2023-11-28 PROCEDURE — 99213 OFFICE O/P EST LOW 20 MIN: CPT | Mod: PBBFAC,PN | Performed by: STUDENT IN AN ORGANIZED HEALTH CARE EDUCATION/TRAINING PROGRAM

## 2023-11-28 PROCEDURE — 99999 PR PBB SHADOW E&M-EST. PATIENT-LVL III: ICD-10-PCS | Mod: PBBFAC,,, | Performed by: STUDENT IN AN ORGANIZED HEALTH CARE EDUCATION/TRAINING PROGRAM

## 2023-11-28 PROCEDURE — 99214 OFFICE O/P EST MOD 30 MIN: CPT | Mod: S$PBB,,, | Performed by: STUDENT IN AN ORGANIZED HEALTH CARE EDUCATION/TRAINING PROGRAM

## 2023-11-28 PROCEDURE — 3078F DIAST BP <80 MM HG: CPT | Mod: CPTII,,, | Performed by: STUDENT IN AN ORGANIZED HEALTH CARE EDUCATION/TRAINING PROGRAM

## 2023-11-28 RX ORDER — CLOTRIMAZOLE AND BETAMETHASONE DIPROPIONATE 10; .64 MG/G; MG/G
CREAM TOPICAL
COMMUNITY

## 2023-11-28 RX ORDER — NYSTATIN 100000 U/G
CREAM TOPICAL
COMMUNITY

## 2023-11-28 RX ORDER — OMEPRAZOLE 20 MG/1
TABLET, DELAYED RELEASE ORAL
COMMUNITY
End: 2023-11-28

## 2023-11-28 RX ORDER — KETOCONAZOLE 20 MG/ML
SHAMPOO, SUSPENSION TOPICAL
COMMUNITY

## 2023-11-28 NOTE — PATIENT INSTRUCTIONS
FD Jon (over the counter) for one month  We will schedule your for an upper endoscopy and CT scan.

## 2023-11-28 NOTE — PROGRESS NOTES
[facility]    Reason for visit: The encounter diagnosis was Abdominal pain, unspecified abdominal location.  Referring Provider/PCP: Seb Rene MD    History of Present Illness:  Vishal Dominguez is a 58 y.o. male who is presenting for initial evaluation of abdominal pain. He is new to me.    Previously seen by Trudy Castro NP about a year ago for right upper quadrant abdominal pain and constipation.  Notes reviewed.  Workup notable for 2 small gallbladder polyps.  He was referred to surgery and HIDA scan was done that was normal.  He underwent a colonoscopy, images reviewed that was normal.  Today, he reports that he continues to have right upper quadrant abdominal pain that is localized to 1 spot, occurs when he is fasting and improves with eating.  No associated nausea, vomiting or GERD symptoms.  He feels like it might get better if he has a bowel movement but not always.  Occasionally has nocturnal.  He feels like he lost about 10 lb in the past 6 months.  He has tried several PPIs in the past but not sure if they have helped.  He is a smoker, denies a family history of pancreas problems      Physical Exam:  Constitutional:  not in acute distress and well developed  HENT: Head: Normal, normocephalic, atraumatic.  Eyes: conjunctiva clear and sclera nonicteric  GI: soft, mild tenderness to palpation in the RUQ  Skin: normal color  Neurological: alert, oriented x3  Psychiatric: mood and affect are within normal limits, pt is a good historian; no memory problems were noted    Laboratory:  Reviewed labs ordered by another provider: CBC, CMP 8/1/2023 in care everywhere. Notable for normal Hgb, platelets, LFTs.    Imaging:  No pertinent imaging.    Endoscopy:  See HPI    Assessment:  Vishal Dominguez is a 58 y.o. male who is presenting for initial evaluation of abdominal pain.    Problems:  RUQ abdominal pain    The patient reports a 2 year history of right upper quadrant abdominal pain.   Workup so far notable for 2 small gallbladder polyps that are unlikely to be the cause of his pain.  Will need surveillance for gallbladder polyps q.6-12 months.  Since he has some red flag symptoms including weight loss and nocturnal symptoms plus his age, will plan for CT with contrast to evaluate the pancreas as well as an EGD to evaluate any mucosal abnormalities including ulcer.  If all negative, this is likely functional dyspepsia.  Will treat with FDgard for now and if no improvement, can try neuromodulation.  Failed PPI.      Plan:  EGD w H pylori biopsies  CT A/P to evaluate the pancreas  FD klaus trial  F/u pending the above    Na Holcomb MD  Gastroenterology and Hepatology    Orders Placed This Encounter   Procedures    CT Abdomen Pelvis W Wo Contrast    CREATININE, SERUM

## 2023-12-19 ENCOUNTER — TELEPHONE (OUTPATIENT)
Dept: SURGERY | Facility: CLINIC | Age: 58
End: 2023-12-19
Payer: MEDICAID

## 2023-12-19 NOTE — TELEPHONE ENCOUNTER
A call was  placed to this patient re: a message received from the G I physician th  schedule this patient for a follow up EGD in two months. The patient verbally consented to be scheduled for the EGD on the date of 02/06/2024. The patient was instructed as follows:    EGD Prep Instructions    [facility]  [address] [facility]    You are scheduled for an EGD with Dr. Na Bay MD on 02/06/2024 at [facility], located [address]  Check in at the admit desk, first floor of the hospital (which is the building on the left).   You will receive a call 2-3 days before your EGD to tell you the time to arrive.  If you have not received a call by the day before your procedure, call the Endoscopy Lab at [phone]    Nothing to eat or drink after midnight before the procedure.  You MAY brush your teeth.    You MAY take your blood pressure, heart, and seizure medication on the morning of the procedure, with a SIP of water.  Hold ALL other medications until after the procedure.    If you are on blood thinners THAT YOU HAVE BEEN INSTRUCTED TO HOLD BY YOUR DOCTOR FOR THIS PROCEDURE, thppen do NOT take this the morning of your EGD.  Do NOT stop these medications on your own, they must be approved to be held by your doctor.  Your EGD can NOT be done if you are on these medications.  Examples of blood thinners include: Coumadin, Aggrenox, Plavix, Pradaxa, Reapro, Pletal, Xarelto, Ticagrelor, Brilinta, Eliquis, and high dose aspirin (325 mg).  You do not have to stop baby aspirin 81 mg.    Please make sure that you have a  home because you will receive an IV sedation. You may NOT take an uber, lyft, taxi, or bus home unless you have a responsible adult with you.     You will receive a call 2-3 days before your EGD to tell you the time to arrive.  If you have not received a call by the day before your procedure, call the Endoscopy department at [phone].

## 2023-12-27 RX ORDER — METRONIDAZOLE 250 MG/1
250 TABLET ORAL EVERY 6 HOURS
Qty: 56 TABLET | Refills: 0 | Status: SHIPPED | OUTPATIENT
Start: 2023-12-27 | End: 2024-01-10

## 2023-12-27 RX ORDER — OMEPRAZOLE 40 MG/1
40 CAPSULE, DELAYED RELEASE ORAL
Qty: 120 CAPSULE | Refills: 0 | Status: SHIPPED | OUTPATIENT
Start: 2023-12-27 | End: 2024-02-25

## 2023-12-27 RX ORDER — TETRACYCLINE HYDROCHLORIDE 500 MG/1
500 CAPSULE ORAL 2 TIMES DAILY
Qty: 28 CAPSULE | Refills: 0 | Status: SHIPPED | OUTPATIENT
Start: 2023-12-27 | End: 2023-12-27

## 2023-12-27 RX ORDER — DOXYCYCLINE 100 MG/1
100 CAPSULE ORAL EVERY 12 HOURS
Qty: 28 CAPSULE | Refills: 0 | Status: SHIPPED | OUTPATIENT
Start: 2023-12-27 | End: 2024-01-10

## 2024-01-02 ENCOUNTER — TELEPHONE (OUTPATIENT)
Dept: GASTROENTEROLOGY | Facility: CLINIC | Age: 59
End: 2024-01-02
Payer: MEDICAID

## 2024-01-02 NOTE — TELEPHONE ENCOUNTER
M for the patient to call me back at [phone].    ----- Message from Nury Multani MA sent at 12/26/2023  9:51 AM CST -----  Contact: pt @ [phone]    ----- Message -----  From: Erinn Hollis  Sent: 12/26/2023   8:15 AM CST  To: [facility] Staff    Vishal Dominguez calling regarding Appointment Access  (message) for #pt is calling concerning procedure on 2/6, asking for call back

## 2024-05-23 ENCOUNTER — TELEPHONE (OUTPATIENT)
Dept: GASTROENTEROLOGY | Facility: CLINIC | Age: 59
End: 2024-05-23
Payer: MEDICAID

## 2024-05-23 NOTE — TELEPHONE ENCOUNTER
Gave the patient an appt for 9/24/2024 at 1:00 and I also put him on the wait list.      ----- Message from tIa Oden MA sent at 5/23/2024  9:42 AM CDT -----  Regarding: FW: self    ----- Message -----  From: Delgado Robb  Sent: 5/23/2024   9:09 AM CDT  To: [facility] Staff  Subject: self                                             Type: Patient Call Back    Who called:self    What is the request in detail:pt is calling to set up appt, said this dr was waiting to see him     Can the clinic reply by [facility]?    Would the patient rather a call back or a response via [facility]? Callback     Best call back number:[phone]    Additional Information: